# Patient Record
Sex: MALE | Race: BLACK OR AFRICAN AMERICAN | NOT HISPANIC OR LATINO | Employment: FULL TIME | URBAN - METROPOLITAN AREA
[De-identification: names, ages, dates, MRNs, and addresses within clinical notes are randomized per-mention and may not be internally consistent; named-entity substitution may affect disease eponyms.]

---

## 2020-05-15 ENCOUNTER — HOSPITAL ENCOUNTER (EMERGENCY)
Facility: HOSPITAL | Age: 25
Discharge: HOME | End: 2020-05-15
Attending: EMERGENCY MEDICINE | Admitting: EMERGENCY MEDICINE

## 2020-05-15 ENCOUNTER — APPOINTMENT (OUTPATIENT)
Dept: CT IMAGING | Facility: HOSPITAL | Age: 25
End: 2020-05-15

## 2020-05-15 ENCOUNTER — APPOINTMENT (OUTPATIENT)
Dept: ULTRASOUND IMAGING | Facility: HOSPITAL | Age: 25
End: 2020-05-15

## 2020-05-15 VITALS
WEIGHT: 220 LBS | HEART RATE: 58 BPM | OXYGEN SATURATION: 99 % | TEMPERATURE: 97.9 F | HEIGHT: 71 IN | SYSTOLIC BLOOD PRESSURE: 126 MMHG | BODY MASS INDEX: 30.8 KG/M2 | RESPIRATION RATE: 16 BRPM | DIASTOLIC BLOOD PRESSURE: 90 MMHG

## 2020-05-15 DIAGNOSIS — N50.819 TESTICULAR PAIN: ICD-10-CM

## 2020-05-15 DIAGNOSIS — M54.50 ACUTE LEFT-SIDED LOW BACK PAIN WITHOUT SCIATICA: Primary | ICD-10-CM

## 2020-05-15 LAB
ANION GAP SERPL CALCULATED.3IONS-SCNC: 7.4 MMOL/L (ref 5–15)
BACTERIA UR QL AUTO: NORMAL /HPF
BASOPHILS # BLD AUTO: 0.04 10*3/MM3 (ref 0–0.2)
BASOPHILS NFR BLD AUTO: 1.1 % (ref 0–1.5)
BILIRUB UR QL STRIP: NEGATIVE
BUN BLD-MCNC: 12 MG/DL (ref 6–20)
BUN/CREAT SERPL: 11.1 (ref 7–25)
CALCIUM SPEC-SCNC: 9.5 MG/DL (ref 8.6–10.5)
CHLORIDE SERPL-SCNC: 102 MMOL/L (ref 98–107)
CLARITY UR: CLEAR
CO2 SERPL-SCNC: 29.6 MMOL/L (ref 22–29)
COLOR UR: YELLOW
CREAT BLD-MCNC: 1.08 MG/DL (ref 0.76–1.27)
DEPRECATED RDW RBC AUTO: 40.1 FL (ref 37–54)
EOSINOPHIL # BLD AUTO: 0.1 10*3/MM3 (ref 0–0.4)
EOSINOPHIL NFR BLD AUTO: 2.8 % (ref 0.3–6.2)
ERYTHROCYTE [DISTWIDTH] IN BLOOD BY AUTOMATED COUNT: 13.1 % (ref 12.3–15.4)
GFR SERPL CREATININE-BSD FRML MDRD: 102 ML/MIN/1.73
GLUCOSE BLD-MCNC: 107 MG/DL (ref 65–99)
GLUCOSE UR STRIP-MCNC: NEGATIVE MG/DL
HCT VFR BLD AUTO: 42.5 % (ref 37.5–51)
HGB BLD-MCNC: 14.2 G/DL (ref 13–17.7)
HGB UR QL STRIP.AUTO: NEGATIVE
HYALINE CASTS UR QL AUTO: NORMAL /LPF
IMM GRANULOCYTES # BLD AUTO: 0.01 10*3/MM3 (ref 0–0.05)
IMM GRANULOCYTES NFR BLD AUTO: 0.3 % (ref 0–0.5)
KETONES UR QL STRIP: NEGATIVE
LEUKOCYTE ESTERASE UR QL STRIP.AUTO: ABNORMAL
LYMPHOCYTES # BLD AUTO: 1.73 10*3/MM3 (ref 0.7–3.1)
LYMPHOCYTES NFR BLD AUTO: 48.9 % (ref 19.6–45.3)
MAGNESIUM SERPL-MCNC: 1.9 MG/DL (ref 1.6–2.6)
MCH RBC QN AUTO: 27.8 PG (ref 26.6–33)
MCHC RBC AUTO-ENTMCNC: 33.4 G/DL (ref 31.5–35.7)
MCV RBC AUTO: 83.3 FL (ref 79–97)
MONOCYTES # BLD AUTO: 0.35 10*3/MM3 (ref 0.1–0.9)
MONOCYTES NFR BLD AUTO: 9.9 % (ref 5–12)
NEUTROPHILS # BLD AUTO: 1.31 10*3/MM3 (ref 1.7–7)
NEUTROPHILS NFR BLD AUTO: 37 % (ref 42.7–76)
NITRITE UR QL STRIP: NEGATIVE
NRBC BLD AUTO-RTO: 0 /100 WBC (ref 0–0.2)
PH UR STRIP.AUTO: 7.5 [PH] (ref 5–8)
PLATELET # BLD AUTO: 302 10*3/MM3 (ref 140–450)
PMV BLD AUTO: 9.9 FL (ref 6–12)
POTASSIUM BLD-SCNC: 4.1 MMOL/L (ref 3.5–5.2)
PROT UR QL STRIP: NEGATIVE
RBC # BLD AUTO: 5.1 10*6/MM3 (ref 4.14–5.8)
RBC # UR: NORMAL /HPF
REF LAB TEST METHOD: NORMAL
SODIUM BLD-SCNC: 139 MMOL/L (ref 136–145)
SP GR UR STRIP: 1.02 (ref 1–1.03)
SQUAMOUS #/AREA URNS HPF: NORMAL /HPF
UROBILINOGEN UR QL STRIP: ABNORMAL
WBC NRBC COR # BLD: 3.54 10*3/MM3 (ref 3.4–10.8)
WBC UR QL AUTO: NORMAL /HPF

## 2020-05-15 PROCEDURE — 25010000002 KETOROLAC TROMETHAMINE PER 15 MG: Performed by: EMERGENCY MEDICINE

## 2020-05-15 PROCEDURE — 99284 EMERGENCY DEPT VISIT MOD MDM: CPT

## 2020-05-15 PROCEDURE — 87591 N.GONORRHOEAE DNA AMP PROB: CPT | Performed by: EMERGENCY MEDICINE

## 2020-05-15 PROCEDURE — 85025 COMPLETE CBC W/AUTO DIFF WBC: CPT | Performed by: EMERGENCY MEDICINE

## 2020-05-15 PROCEDURE — 25010000002 IOPAMIDOL 61 % SOLUTION: Performed by: EMERGENCY MEDICINE

## 2020-05-15 PROCEDURE — 93976 VASCULAR STUDY: CPT

## 2020-05-15 PROCEDURE — 74177 CT ABD & PELVIS W/CONTRAST: CPT

## 2020-05-15 PROCEDURE — 76870 US EXAM SCROTUM: CPT

## 2020-05-15 PROCEDURE — 81001 URINALYSIS AUTO W/SCOPE: CPT | Performed by: EMERGENCY MEDICINE

## 2020-05-15 PROCEDURE — 80048 BASIC METABOLIC PNL TOTAL CA: CPT | Performed by: EMERGENCY MEDICINE

## 2020-05-15 PROCEDURE — 96374 THER/PROPH/DIAG INJ IV PUSH: CPT

## 2020-05-15 PROCEDURE — 83735 ASSAY OF MAGNESIUM: CPT | Performed by: EMERGENCY MEDICINE

## 2020-05-15 RX ORDER — KETOROLAC TROMETHAMINE 15 MG/ML
15 INJECTION, SOLUTION INTRAMUSCULAR; INTRAVENOUS ONCE
Status: COMPLETED | OUTPATIENT
Start: 2020-05-15 | End: 2020-05-15

## 2020-05-15 RX ORDER — SODIUM CHLORIDE 0.9 % (FLUSH) 0.9 %
10 SYRINGE (ML) INJECTION AS NEEDED
Status: DISCONTINUED | OUTPATIENT
Start: 2020-05-15 | End: 2020-05-15 | Stop reason: HOSPADM

## 2020-05-15 RX ADMIN — SODIUM CHLORIDE 1000 ML: 9 INJECTION, SOLUTION INTRAVENOUS at 13:13

## 2020-05-15 RX ADMIN — KETOROLAC TROMETHAMINE 15 MG: 15 INJECTION, SOLUTION INTRAMUSCULAR; INTRAVENOUS at 13:11

## 2020-05-15 RX ADMIN — IOPAMIDOL 85 ML: 612 INJECTION, SOLUTION INTRAVENOUS at 14:09

## 2020-05-15 NOTE — ED PROVIDER NOTES
EMERGENCY DEPARTMENT ENCOUNTER    Room Number:  23/23  Date of encounter:  5/15/2020  PCP: Provider, No Known  Historian: Patient      HPI:  Chief Complaint: Low back pain in the lower lumbar region and some testicle pain mainly on the left  A complete HPI/ROS/PMH/PSH/SH/FH are unobtainable due to: Not applicable  Context: Rudy Garcia is a 25 y.o. male who presents to the ED c/o approximately 3 days ago.  Pain at times time is sharp.  Pain is worse with moving such as getting up and moving of his low back.  That movement consists of bending over or twisting.  He believes that the pain does radiate to his left testicle at times.  The pain does come and go.  There is no relieving symptoms.  Aggravating symptoms are mentioned above.  He has no urinary or fecal incontinence or retention, fever, weakness to lower extremity, any numbness or tingling to lower extremity.  He does not have any abdominal pain.  He has been tolerating p.o. well.  No urinary symptoms such as burning with urination, frequent urination, discharge from penis, or concerns that he has a sexually transmitted disease.  He is sexually active but is monogamous.  He is unaware if he is ever had a kidney stone before or he is unaware of any family history of kidney stones.  No chest pain, shortness of breath, cough, or fever.  No risk factors for COVID as far as no direct exposure or travel history.      Previous Episodes: No  Current Symptoms: See above    MEDICAL HISTORY REVIEWED    No old records in epic    PAST MEDICAL HISTORY  Active Ambulatory Problems     Diagnosis Date Noted   • No Active Ambulatory Problems     Resolved Ambulatory Problems     Diagnosis Date Noted   • No Resolved Ambulatory Problems     No Additional Past Medical History         PAST SURGICAL HISTORY  History reviewed. No pertinent surgical history.      FAMILY HISTORY  History reviewed. No pertinent family history.      SOCIAL HISTORY  Social History     Socioeconomic History   •  Marital status:      Spouse name: Not on file   • Number of children: Not on file   • Years of education: Not on file   • Highest education level: Not on file   Tobacco Use   • Smoking status: Never Smoker   • Smokeless tobacco: Never Used   Substance and Sexual Activity   • Alcohol use: Yes     Alcohol/week: 1.0 standard drinks     Types: 1 Cans of beer per week   • Drug use: Never         ALLERGIES  Patient has no known allergies.        REVIEW OF SYSTEMS  Review of Systems     All systems reviewed and negative except for those discussed in HPI.       PHYSICAL EXAM    I have reviewed the triage vital signs and nursing notes.    ED Triage Vitals [05/15/20 1208]   Temp Heart Rate Resp BP SpO2   97.9 °F (36.6 °C) 87 16 -- 99 %      Temp src Heart Rate Source Patient Position BP Location FiO2 (%)   Tympanic Monitor -- -- --       GENERAL: Looks well no acute distress.Vital signs on my initial evaluation normal  HENT: nares patent  Head/neck/ face are symmetric without gross deformity, signs of trauma, or swelling  EYES: no scleral icterus, no conjunctival pallor.  NECK: Supple, no meningismus  CV: regular rhythm, regular rate with intact distal pulses.  RESPIRATORY: normal effort and no respiratory distress.  ABDOMEN: soft and non-tender.  BACK.  Patient has pain in his very lower lumbar sacral area just left of midline.  He has normal inspection.,  No rash, no deformity, no signs of infection.  GENITOURINARY: Normal phallus. Bilaterally descended testes without masses. No scrotal masses. No hernia.  No inguinal adenopathy.  No rash or signs of infection.No urethral discharge.  Patient has tenderness to his left testicle that is mild.  There is no swelling.  It is present with palpation and is more anterior inferior aspect of left testicle..  MUSCULOSKELETAL: no deformity.  Moves all extremities  NEURO: alert and appropriate, moves all extremities, follows commands.  No focal weakness  SKIN: warm, dry    Vital  signs and nursing notes reviewed.        LAB RESULTS  Recent Results (from the past 24 hour(s))   Basic Metabolic Panel    Collection Time: 05/15/20  1:03 PM   Result Value Ref Range    Glucose 107 (H) 65 - 99 mg/dL    BUN 12 6 - 20 mg/dL    Creatinine 1.08 0.76 - 1.27 mg/dL    Sodium 139 136 - 145 mmol/L    Potassium 4.1 3.5 - 5.2 mmol/L    Chloride 102 98 - 107 mmol/L    CO2 29.6 (H) 22.0 - 29.0 mmol/L    Calcium 9.5 8.6 - 10.5 mg/dL    eGFR  African Amer 102 >60 mL/min/1.73    BUN/Creatinine Ratio 11.1 7.0 - 25.0    Anion Gap 7.4 5.0 - 15.0 mmol/L   Magnesium    Collection Time: 05/15/20  1:03 PM   Result Value Ref Range    Magnesium 1.9 1.6 - 2.6 mg/dL   CBC Auto Differential    Collection Time: 05/15/20  1:03 PM   Result Value Ref Range    WBC 3.54 3.40 - 10.80 10*3/mm3    RBC 5.10 4.14 - 5.80 10*6/mm3    Hemoglobin 14.2 13.0 - 17.7 g/dL    Hematocrit 42.5 37.5 - 51.0 %    MCV 83.3 79.0 - 97.0 fL    MCH 27.8 26.6 - 33.0 pg    MCHC 33.4 31.5 - 35.7 g/dL    RDW 13.1 12.3 - 15.4 %    RDW-SD 40.1 37.0 - 54.0 fl    MPV 9.9 6.0 - 12.0 fL    Platelets 302 140 - 450 10*3/mm3    Neutrophil % 37.0 (L) 42.7 - 76.0 %    Lymphocyte % 48.9 (H) 19.6 - 45.3 %    Monocyte % 9.9 5.0 - 12.0 %    Eosinophil % 2.8 0.3 - 6.2 %    Basophil % 1.1 0.0 - 1.5 %    Immature Grans % 0.3 0.0 - 0.5 %    Neutrophils, Absolute 1.31 (L) 1.70 - 7.00 10*3/mm3    Lymphocytes, Absolute 1.73 0.70 - 3.10 10*3/mm3    Monocytes, Absolute 0.35 0.10 - 0.90 10*3/mm3    Eosinophils, Absolute 0.10 0.00 - 0.40 10*3/mm3    Basophils, Absolute 0.04 0.00 - 0.20 10*3/mm3    Immature Grans, Absolute 0.01 0.00 - 0.05 10*3/mm3    nRBC 0.0 0.0 - 0.2 /100 WBC   Urinalysis With Microscopic If Indicated (No Culture) - Urine, Clean Catch    Collection Time: 05/15/20  1:14 PM   Result Value Ref Range    Color, UA Yellow Yellow, Straw    Appearance, UA Clear Clear    pH, UA 7.5 5.0 - 8.0    Specific Gravity, UA 1.020 1.005 - 1.030    Glucose, UA Negative Negative     Ketones, UA Negative Negative    Bilirubin, UA Negative Negative    Blood, UA Negative Negative    Protein, UA Negative Negative    Leuk Esterase, UA Trace (A) Negative    Nitrite, UA Negative Negative    Urobilinogen, UA 0.2 E.U./dL 0.2 - 1.0 E.U./dL   Urinalysis, Microscopic Only - Urine, Clean Catch    Collection Time: 05/15/20  1:14 PM   Result Value Ref Range    RBC, UA 0-2 None Seen, 0-2 /HPF    WBC, UA 0-2 None Seen, 0-2 /HPF    Bacteria, UA None Seen None Seen /HPF    Squamous Epithelial Cells, UA None Seen None Seen, 0-2 /HPF    Hyaline Casts, UA None Seen None Seen /LPF    Methodology Automated Microscopy        Ordered the above labs and independently reviewed the results.        RADIOLOGY  Us Scrotum & Testicles    Result Date: 5/15/2020  TESTICULAR ULTRASOUND INCLUDING DOPPLER VASCULAR EVALUATION  HISTORY: Bilateral testicular pain.  FINDINGS: The examination was performed as an emergency procedure. Both testicles appear normal in size and texture and the Doppler vascular evaluation appears normal. There is no epididymal enlargement. There are tiny bilateral hydroceles and a suggestion of a minimal left-sided varicocele.  CONCLUSION: No evidence of testicular torsion. No evidence of epididymitis. Tiny bilateral hydroceles and minimal left varicocele.  This report was finalized on 5/15/2020 5:12 PM by Dr. Tai Ford M.D.      Ct Abdomen Pelvis With Contrast    Result Date: 5/15/2020  CT ABDOMEN AND PELVIS WITH IV CONTRAST  HISTORY: Low back pain with radiation to his left testicle  TECHNIQUE: Radiation dose reduction techniques were utilized, including automated exposure control and exposure modulation based on body size. 3 mm images were obtained through the abdomen and pelvis after the administration of IV contrast.  COMPARISON: None  FINDINGS:  There are no findings of small bowel obstruction. The appendix is unremarkable. The graft the liver, gallbladder, pancreas, spleen and adrenal glands have  an unremarkable postcontrast CT appearance. Subcentimeter hypodense renal lesions are too small to characterize. There is no hydronephrosis.  There is no abdominopelvic adenopathy by size criteria. There is no free intraperitoneal air or fluid.  There are no suspicious lytic or blastic osseous lesions. The bladder is decompressed and not well evaluated.      1.  No findings of acute intraabdominal pathology. 2.  Incidental findings as above.   This report was finalized on 5/15/2020 2:23 PM by Dr. Portillo Graham M.D.        I ordered the above noted radiological studies. Reviewed by me and discussed with radiologist.  See dictation for official radiology interpretation.      PROCEDURES    Procedures      MEDICATIONS GIVEN IN ER    Medications   sodium chloride 0.9 % flush 10 mL (has no administration in time range)   ketorolac (TORADOL) injection 15 mg (15 mg Intravenous Given 5/15/20 1311)   sodium chloride 0.9 % bolus 1,000 mL (0 mL Intravenous Stopped 5/15/20 1401)   iopamidol (ISOVUE-300) 61 % injection 100 mL (85 mL Intravenous Given by Other 5/15/20 1409)         PROGRESS, DATA ANALYSIS, CONSULTS, AND MEDICAL DECISION MAKING    Informed patient of initial treatment plan.  Informed patient of my initial concerns.  All questions were answered.  We are currently under a pandemic from the COVID19 infection.  The patient presented to the emergency department by ambulance or personal vehicle.  During current hospital restrictions no other visitors were present in the emergency department during my evaluation and treatment. I followed the current protocols required by Infection Control at [facility] in my evaluation and treatment of the patient. The patient was wearing a face mask during my evaluation and throughout my encounter. During my whole encounter with this patient I used appropriate personal protective equipment.  This equipment consisted of eye protection, facemask, gown, and gloves.  I  applied this equipment before entering the room.      All labs have been independently reviewed by me.  All radiology studies have been reviewed by me and discussed with radiologist dictating the report.   EKG's independently viewed and interpreted by me.  Discussion below represents my analysis of pertinent findings related to patient's condition, differential diagnosis, treatment plan and final disposition.      ED Course as of May 15 1833   Fri May 15, 2020   1547 CT scan was unremarkable.  With the testicular pain and him being a young male I will go ahead and order a testicular ultrasound.  My clinical suspicion and his exam of his testicle is very benign.    [MM]   1617 The testicular ultrasound was essentially unremarkable.  No signs of torsion.  Patient has a very small hydrocele on the right and a questionable very small first borderline varicocele on the left.    [MM]      ED Course User Index  [MM] Betito Hoffmann MD       AS OF 18:33 VITALS:    BP - 126/90  HR - 58  TEMP - 97.9 °F (36.6 °C) (Tympanic)  02 SATS - 99%        DIAGNOSIS  Final diagnoses:   Acute left-sided low back pain without sciatica   Testicular pain         DISPOSITION  DISCHARGE    Patient discharged in stable condition.    Reviewed implications of results, diagnosis, meds, responsibility to follow up, warning signs and symptoms of possible worsening, potential complications and reasons to return to ER, including worsening of symptoms, weakness to lower extremities, urinary or fecal incontinence or retention, fever, shortness of breath, or any concerns.    Patient/Family voiced understanding of above instructions.    Discussed plan for discharge, as there is no emergent indication for admission. Pt/family is agreeable and understands need for follow up and repeat testing.  Pt is aware that discharge does not mean that nothing is wrong but it indicates no emergency is present that requires admission and they must continue care with  follow-up as given below or physician of their choice.     FOLLOW-UP  Kai Brandon MD  [address] [facility] [id number] [phone]      Call Monday morning for follow-up in the next week., Return if pain worsens, If symptoms worsen, shortness of breath, fever, any concerns    [facility] [id number]  [phone]    Call Monday to arrange follow-up with a new primary care provider in the next 1 to 2 weeks.  Return if urinary or fecal incontinence or retention, weakness to lower extremities, or worsening of symptoms, shortness of breath, fever, any concerns         Medication List      No changes were made to your prescriptions during this visit.                  Betito Hoffmann MD  05/15/20 [id number]

## 2020-05-15 NOTE — ED NOTES
Pt sent over from [facility] for bilateral testicular pain and back lower back pain that began 3 days ago. Denies injury or heavy lifting. Pt denies cough, SOA, fever, loss of taste or smell. No known exposure to COVID-19.    Triage staff wearing mask and glasses. Pt wearing mask.       Veronica Swartz, RN  05/15/20 [id number]

## 2020-05-18 LAB — N GONORRHOEA DNA SPEC QL NAA+PROBE: NEGATIVE
